# Patient Record
Sex: FEMALE | Race: BLACK OR AFRICAN AMERICAN | NOT HISPANIC OR LATINO | Employment: FULL TIME | URBAN - METROPOLITAN AREA
[De-identification: names, ages, dates, MRNs, and addresses within clinical notes are randomized per-mention and may not be internally consistent; named-entity substitution may affect disease eponyms.]

---

## 2019-09-08 ENCOUNTER — HOSPITAL ENCOUNTER (EMERGENCY)
Facility: HOSPITAL | Age: 64
Discharge: HOME | End: 2019-09-08
Attending: EMERGENCY MEDICINE
Payer: COMMERCIAL

## 2019-09-08 VITALS
TEMPERATURE: 98 F | SYSTOLIC BLOOD PRESSURE: 134 MMHG | BODY MASS INDEX: 33.6 KG/M2 | WEIGHT: 189.63 LBS | OXYGEN SATURATION: 97 % | HEART RATE: 67 BPM | RESPIRATION RATE: 21 BRPM | HEIGHT: 63 IN | DIASTOLIC BLOOD PRESSURE: 68 MMHG

## 2019-09-08 DIAGNOSIS — M25.562 PAIN AND SWELLING OF KNEE, LEFT: ICD-10-CM

## 2019-09-08 DIAGNOSIS — M25.462 PAIN AND SWELLING OF KNEE, LEFT: ICD-10-CM

## 2019-09-08 DIAGNOSIS — M79.662 PAIN OF LEFT CALF: ICD-10-CM

## 2019-09-08 DIAGNOSIS — S83.92XA SPRAIN OF LEFT KNEE, UNSPECIFIED LIGAMENT, INITIAL ENCOUNTER: Primary | ICD-10-CM

## 2019-09-08 LAB — HIV 1+2 AB+HIV1 P24 AG SERPL QL IA: NEGATIVE

## 2019-09-08 PROCEDURE — 86703 HIV-1/HIV-2 1 RESULT ANTBDY: CPT

## 2019-09-08 PROCEDURE — 99284 EMERGENCY DEPT VISIT MOD MDM: CPT | Mod: 25

## 2019-09-08 PROCEDURE — 25000003 PHARM REV CODE 250: Performed by: EMERGENCY MEDICINE

## 2019-09-08 RX ORDER — MELOXICAM 7.5 MG/1
15 TABLET ORAL DAILY
Qty: 20 TABLET | Refills: 0 | Status: SHIPPED | OUTPATIENT
Start: 2019-09-08 | End: 2019-09-18

## 2019-09-08 RX ORDER — NAPROXEN 500 MG/1
500 TABLET ORAL
Status: COMPLETED | OUTPATIENT
Start: 2019-09-08 | End: 2019-09-08

## 2019-09-08 RX ADMIN — NAPROXEN 500 MG: 500 TABLET ORAL at 10:09

## 2019-09-09 NOTE — ED NOTES
"Spoke with ADAM BookerC regarding patient wanting to be seen by a "Real Doctor" and not a midlevel and also regarding patient needing ED bed. WIll move to ED bed when available  "

## 2019-09-09 NOTE — ED PROVIDER NOTES
"Jamaica Gonzales 63 y.o. female PMHX LLE DVT presents to ED with c/o left knee and calf pain with swelling onset one month ago. Pt denies any injury or trauma. She reports is under Dr. Schilling care for DVT in LLE. She denies being on any blood thinner use. She reports HA and feeling fatigued. She denies any chest pain, SOB, N/V, abd pain at present.   8:19 PM I, Hanane Ceedno NP have performed the initial provider exam in triage, which included initial history, focused physical exam, and initial orders. The pt will be placed in a room as soon as possible with definitive evaluation by another provider.     Hanane Cedeno NP    PHYSICIAN NOTE  SCRIBE #1 NOTE: I, Fannie Jasso, am scribing for, and in the presence of, Edson Marquez MD. I have scribed the entire note.      History      Chief Complaint   Patient presents with    Knee Pain     Pt states, 'My left knee is just hurting so bad."       Review of patient's allergies indicates:  No Known Allergies     HPI   HPI    9/8/2019, 9:46 PM   History obtained from the patient      History of Present Illness: Jamaica Gonzales is a 63 y.o. female patient with a PMHx of DVT who presents to the Emergency Department for evaluation of Left knee pain which onset x1 month ago. Symptoms are gradually worsening and moderate in severity. Patient reports pain in her knee with flexion and extension, and states the pain is more severe in the lateral aspect. No mitigating or exacerbating factors reported. No associated sxs. Patient denies any fever, chills, extremity numbness/weakness, swelling, color change, recent trauma/injury, and all other sxs at this time. No prior Tx. No further complaints or concerns at this time.       Arrival mode: Personal vehicle     PCP: Primary Doctor No     Past Medical History:  Past medical history reviewed not relevant      Past Surgical History:  Past surgical history reviewed not relevant      Family History:  Family history reviewed not " relevant    Social History:  Social History     Tobacco Use    Smoking status: Unknown   Substance and Sexual Activity    Alcohol use: Unknown    Drug use: Unknown    Sexual activity: Unknown       ROS   Review of Systems   Constitutional: Negative for chills and fever.   HENT: Negative for sore throat.    Respiratory: Negative for shortness of breath.    Cardiovascular: Negative for chest pain and leg swelling.   Gastrointestinal: Negative for nausea.   Genitourinary: Negative for dysuria.   Musculoskeletal: Negative for back pain.        (+) L knee pain   Skin: Negative for color change and rash.   Neurological: Negative for weakness and numbness.   Hematological: Does not bruise/bleed easily.   All other systems reviewed and are negative.    Physical Exam      Initial Vitals [09/08/19 1837]   BP Pulse Resp Temp SpO2   (!) 151/68 75 20 98.3 °F (36.8 °C) 97 %      MAP       --          Physical Exam  Nursing Notes and Vital Signs Reviewed.  Constitutional: Patient is in no acute distress. Well-developed and well-nourished.  Head: Atraumatic. Normocephalic.  Eyes: PERRL. EOM intact. Conjunctivae are not pale. No scleral icterus.  ENT: Mucous membranes are moist.    Neck: Supple. Full ROM. No lymphadenopathy.  Cardiovascular: Regular rate. Regular rhythm. No murmurs, rubs, or gallops. Distal pulses are 2+ and symmetric.  Pulmonary/Chest: No respiratory distress. Clear to auscultation bilaterally. No wheezing or rales.  Abdominal: Soft and non-distended.  There is no tenderness.  No rebound, guarding, or rigidity.  Genitourinary: No CVA tenderness  Musculoskeletal: Moves all extremities. No obvious deformities. Mild ankle edema. No calf tenderness  Left Knee:  No obvious deformity. There is no appreciable swelling.  Left lateral and medial ligamentous tenderness. Negative anterior and posterior drawer signs. No bony tenderness. No increased warmth, erythema, induration or fluctuance.  No ligament laxity. DP and PT  "pulses are 2+.  Normal capillary refill.  Distal sensation is intact.  Skin: Warm and dry.  Neurological:  Alert, awake, and appropriate.  Normal speech.  No acute focal neurological deficits are appreciated.  Psychiatric: Normal affect. Good eye contact. Appropriate in content.    ED Course    Procedures  ED Vital Signs:  Vitals:    09/08/19 1837 09/08/19 2058 09/08/19 2100 09/08/19 2122   BP: (!) 151/68  132/77 134/68   Pulse: 75 60 60 67   Resp: 20  (!) 21 (!) 21   Temp: 98.3 °F (36.8 °C)      TempSrc: Oral      SpO2: 97%  97% 97%   Weight: 86 kg (189 lb 9.5 oz)      Height: 5' 3" (1.6 m)          Abnormal Lab Results:  Labs Reviewed   HIV 1 / 2 ANTIBODY        All Lab Results:  Results for orders placed or performed during the hospital encounter of 09/08/19   HIV 1/2 Ag/Ab (4th Gen)   Result Value Ref Range    HIV 1/2 Ag/Ab Negative Negative         Imaging Results:  Imaging Results          US Lower Extremity Veins Bilateral (Final result)  Result time 09/08/19 21:24:08    Final result by Andrade Ornelas MD (09/08/19 21:24:08)                 Impression:      Negative for lower extremity DVT, bilaterally.      Electronically signed by: Andrade Ornelas  Date:    09/08/2019  Time:    21:24             Narrative:    EXAMINATION:  US LOWER EXTREMITY VEINS BILATERAL    CLINICAL HISTORY:  . Left lower extremity swelling.    COMPARISON:  None.    FINDINGS:  The deep veins demonstrate a normal appearance of the common femoral vein, deep profunda, superficial femoral and popliteal vein.  There is no evidence of deep venous thrombosis.  Additionally, the greater saphenous, tibial and peroneal veins are patent.                               X-Ray Knee 3 View Left (Final result)  Result time 09/08/19 20:50:07   Procedure changed from X-Ray Knee 3 View Right     Final result by Andrade Ornelas MD (09/08/19 20:50:07)                 Impression:      Negative .      Electronically signed by: Andrade" Johnson  Date:    09/08/2019  Time:    20:50             Narrative:    EXAMINATION:  XR KNEE 3 VIEW LEFT    CLINICAL HISTORY:  knee pain; Pain in left knee    COMPARISON:  None    FINDINGS:  No fracture or dislocation.  Joint spaces are normal.  Soft tissues normal.                                        The Emergency Provider reviewed the vital signs and test results, which are outlined above.    ED Discussion     10:26 PM: Reassessed pt at this time.  Pt states her condition has improved at this time. Discussed with pt all pertinent ED information and results. Discussed pt dx and plan of tx. Gave pt all f/u and return to the ED instructions. All questions and concerns were addressed at this time. Pt expresses understanding of information and instructions, and is comfortable with plan to discharge. Pt is stable for discharge.    I discussed with patient and/or family/caretaker that evaluation in the ED does not suggest any emergent or life threatening medical conditions requiring immediate intervention beyond what was provided in the ED, and I believe patient is safe for discharge.  Regardless, an unremarkable evaluation in the ED does not preclude the development or presence of a serious of life threatening condition. As such, patient was instructed to return immediately for any worsening or change in current symptoms.      ED Medication(s):  Medications   naproxen tablet 500 mg (500 mg Oral Given 9/8/19 2224)             Medical Decision Making    Medical Decision Making:   Clinical Tests:   Lab Tests: Ordered and Reviewed  Radiological Study: Reviewed and Ordered           Scribe Attestation:   Scribe #1: I performed the above scribed service and the documentation accurately describes the services I performed. I attest to the accuracy of the note.    Attending:   Physician Attestation Statement for Scribe #1: I, Edson Marquez MD, personally performed the services described in this documentation, as  scribed by Fannie Jasso, in my presence, and it is both accurate and complete.          Clinical Impression       ICD-10-CM ICD-9-CM   1. Sprain of left knee, unspecified ligament, initial encounter S83.92XA 844.9   2. Pain and swelling of knee, left M25.562 719.46    M25.462 719.06   3. Pain of left calf M79.662 729.5       Disposition:   Disposition: Discharged  Condition: Stable         Edson Marquez MD  09/09/19 1121

## 2022-10-25 ENCOUNTER — LAB VISIT (OUTPATIENT)
Dept: LAB | Facility: HOSPITAL | Age: 67
End: 2022-10-25
Attending: FAMILY MEDICINE
Payer: MEDICARE

## 2022-10-25 ENCOUNTER — OFFICE VISIT (OUTPATIENT)
Dept: PRIMARY CARE CLINIC | Facility: CLINIC | Age: 67
End: 2022-10-25
Payer: MEDICARE

## 2022-10-25 VITALS
SYSTOLIC BLOOD PRESSURE: 146 MMHG | HEART RATE: 79 BPM | WEIGHT: 212.38 LBS | TEMPERATURE: 98 F | HEIGHT: 64 IN | DIASTOLIC BLOOD PRESSURE: 98 MMHG | OXYGEN SATURATION: 97 % | BODY MASS INDEX: 36.26 KG/M2

## 2022-10-25 DIAGNOSIS — E66.01 SEVERE OBESITY (BMI 35.0-39.9) WITH COMORBIDITY: ICD-10-CM

## 2022-10-25 DIAGNOSIS — I77.1 TORTUOUS AORTA: ICD-10-CM

## 2022-10-25 DIAGNOSIS — Z11.59 ENCOUNTER FOR HEPATITIS C SCREENING TEST FOR LOW RISK PATIENT: ICD-10-CM

## 2022-10-25 DIAGNOSIS — Z86.718 HISTORY OF DVT (DEEP VEIN THROMBOSIS): ICD-10-CM

## 2022-10-25 DIAGNOSIS — Z01.419 WELL WOMAN EXAM: ICD-10-CM

## 2022-10-25 DIAGNOSIS — K21.9 GASTROESOPHAGEAL REFLUX DISEASE WITHOUT ESOPHAGITIS: ICD-10-CM

## 2022-10-25 DIAGNOSIS — R60.0 LEG EDEMA, LEFT: ICD-10-CM

## 2022-10-25 DIAGNOSIS — Z12.11 COLON CANCER SCREENING: ICD-10-CM

## 2022-10-25 DIAGNOSIS — Z78.0 ASYMPTOMATIC MENOPAUSE: ICD-10-CM

## 2022-10-25 DIAGNOSIS — I10 ELEVATED BLOOD PRESSURE READING WITH DIAGNOSIS OF HYPERTENSION: Primary | ICD-10-CM

## 2022-10-25 DIAGNOSIS — Z23 FLU VACCINE NEED: ICD-10-CM

## 2022-10-25 DIAGNOSIS — I10 ELEVATED BLOOD PRESSURE READING WITH DIAGNOSIS OF HYPERTENSION: ICD-10-CM

## 2022-10-25 DIAGNOSIS — Z12.31 BREAST CANCER SCREENING BY MAMMOGRAM: ICD-10-CM

## 2022-10-25 LAB
ALBUMIN SERPL BCP-MCNC: 3.7 G/DL (ref 3.5–5.2)
ALP SERPL-CCNC: 150 U/L (ref 55–135)
ALT SERPL W/O P-5'-P-CCNC: 15 U/L (ref 10–44)
ANION GAP SERPL CALC-SCNC: 9 MMOL/L (ref 8–16)
AST SERPL-CCNC: 21 U/L (ref 10–40)
BILIRUB SERPL-MCNC: 0.7 MG/DL (ref 0.1–1)
BUN SERPL-MCNC: 9 MG/DL (ref 8–23)
CALCIUM SERPL-MCNC: 10 MG/DL (ref 8.7–10.5)
CHLORIDE SERPL-SCNC: 107 MMOL/L (ref 95–110)
CHOLEST SERPL-MCNC: 206 MG/DL (ref 120–199)
CHOLEST/HDLC SERPL: 4 {RATIO} (ref 2–5)
CO2 SERPL-SCNC: 25 MMOL/L (ref 23–29)
CREAT SERPL-MCNC: 1.1 MG/DL (ref 0.5–1.4)
EST. GFR  (NO RACE VARIABLE): 55.4 ML/MIN/1.73 M^2
GLUCOSE SERPL-MCNC: 69 MG/DL (ref 70–110)
HDLC SERPL-MCNC: 51 MG/DL (ref 40–75)
HDLC SERPL: 24.8 % (ref 20–50)
LDLC SERPL CALC-MCNC: 125 MG/DL (ref 63–159)
NONHDLC SERPL-MCNC: 155 MG/DL
POTASSIUM SERPL-SCNC: 3.9 MMOL/L (ref 3.5–5.1)
PROT SERPL-MCNC: 7.8 G/DL (ref 6–8.4)
SODIUM SERPL-SCNC: 141 MMOL/L (ref 136–145)
TRIGL SERPL-MCNC: 150 MG/DL (ref 30–150)

## 2022-10-25 PROCEDURE — G0008 FLU VACCINE - QUADRIVALENT - ADJUVANTED: ICD-10-PCS | Mod: S$GLB,,, | Performed by: FAMILY MEDICINE

## 2022-10-25 PROCEDURE — 99204 PR OFFICE/OUTPT VISIT, NEW, LEVL IV, 45-59 MIN: ICD-10-PCS | Mod: 25,S$GLB,, | Performed by: FAMILY MEDICINE

## 2022-10-25 PROCEDURE — 1159F MED LIST DOCD IN RCRD: CPT | Mod: CPTII,S$GLB,, | Performed by: FAMILY MEDICINE

## 2022-10-25 PROCEDURE — 80053 COMPREHEN METABOLIC PANEL: CPT | Performed by: FAMILY MEDICINE

## 2022-10-25 PROCEDURE — 1125F AMNT PAIN NOTED PAIN PRSNT: CPT | Mod: CPTII,S$GLB,, | Performed by: FAMILY MEDICINE

## 2022-10-25 PROCEDURE — 83036 HEMOGLOBIN GLYCOSYLATED A1C: CPT | Performed by: FAMILY MEDICINE

## 2022-10-25 PROCEDURE — 86803 HEPATITIS C AB TEST: CPT | Performed by: FAMILY MEDICINE

## 2022-10-25 PROCEDURE — 80061 LIPID PANEL: CPT | Performed by: FAMILY MEDICINE

## 2022-10-25 PROCEDURE — 99204 OFFICE O/P NEW MOD 45 MIN: CPT | Mod: 25,S$GLB,, | Performed by: FAMILY MEDICINE

## 2022-10-25 PROCEDURE — 3077F SYST BP >= 140 MM HG: CPT | Mod: CPTII,S$GLB,, | Performed by: FAMILY MEDICINE

## 2022-10-25 PROCEDURE — 3080F PR MOST RECENT DIASTOLIC BLOOD PRESSURE >= 90 MM HG: ICD-10-PCS | Mod: CPTII,S$GLB,, | Performed by: FAMILY MEDICINE

## 2022-10-25 PROCEDURE — 36415 COLL VENOUS BLD VENIPUNCTURE: CPT | Mod: PN | Performed by: FAMILY MEDICINE

## 2022-10-25 PROCEDURE — 1101F PT FALLS ASSESS-DOCD LE1/YR: CPT | Mod: CPTII,S$GLB,, | Performed by: FAMILY MEDICINE

## 2022-10-25 PROCEDURE — 90694 FLU VACCINE - QUADRIVALENT - ADJUVANTED: ICD-10-PCS | Mod: S$GLB,,, | Performed by: FAMILY MEDICINE

## 2022-10-25 PROCEDURE — 3288F FALL RISK ASSESSMENT DOCD: CPT | Mod: CPTII,S$GLB,, | Performed by: FAMILY MEDICINE

## 2022-10-25 PROCEDURE — G0008 ADMIN INFLUENZA VIRUS VAC: HCPCS | Mod: S$GLB,,, | Performed by: FAMILY MEDICINE

## 2022-10-25 PROCEDURE — 84443 ASSAY THYROID STIM HORMONE: CPT | Performed by: FAMILY MEDICINE

## 2022-10-25 PROCEDURE — 3077F PR MOST RECENT SYSTOLIC BLOOD PRESSURE >= 140 MM HG: ICD-10-PCS | Mod: CPTII,S$GLB,, | Performed by: FAMILY MEDICINE

## 2022-10-25 PROCEDURE — 3288F PR FALLS RISK ASSESSMENT DOCUMENTED: ICD-10-PCS | Mod: CPTII,S$GLB,, | Performed by: FAMILY MEDICINE

## 2022-10-25 PROCEDURE — 3080F DIAST BP >= 90 MM HG: CPT | Mod: CPTII,S$GLB,, | Performed by: FAMILY MEDICINE

## 2022-10-25 PROCEDURE — 1159F PR MEDICATION LIST DOCUMENTED IN MEDICAL RECORD: ICD-10-PCS | Mod: CPTII,S$GLB,, | Performed by: FAMILY MEDICINE

## 2022-10-25 PROCEDURE — 90694 VACC AIIV4 NO PRSRV 0.5ML IM: CPT | Mod: S$GLB,,, | Performed by: FAMILY MEDICINE

## 2022-10-25 PROCEDURE — 99999 PR PBB SHADOW E&M-NEW PATIENT-LVL V: CPT | Mod: PBBFAC,,, | Performed by: FAMILY MEDICINE

## 2022-10-25 PROCEDURE — 1101F PR PT FALLS ASSESS DOC 0-1 FALLS W/OUT INJ PAST YR: ICD-10-PCS | Mod: CPTII,S$GLB,, | Performed by: FAMILY MEDICINE

## 2022-10-25 PROCEDURE — 1125F PR PAIN SEVERITY QUANTIFIED, PAIN PRESENT: ICD-10-PCS | Mod: CPTII,S$GLB,, | Performed by: FAMILY MEDICINE

## 2022-10-25 PROCEDURE — 85025 COMPLETE CBC W/AUTO DIFF WBC: CPT | Performed by: FAMILY MEDICINE

## 2022-10-25 PROCEDURE — 99999 PR PBB SHADOW E&M-NEW PATIENT-LVL V: ICD-10-PCS | Mod: PBBFAC,,, | Performed by: FAMILY MEDICINE

## 2022-10-25 RX ORDER — OMEPRAZOLE 20 MG/1
20 CAPSULE, DELAYED RELEASE ORAL DAILY
Qty: 30 CAPSULE | Refills: 5 | Status: SHIPPED | OUTPATIENT
Start: 2022-10-25 | End: 2022-10-26 | Stop reason: SDUPTHER

## 2022-10-25 NOTE — PROGRESS NOTES
Subjective:       Patient ID: Jamaica Botello is a 67 y.o. female.    Chief Complaint: Establish Care, annual       HPI   History of Present Illness:   Jamaica Botello 67 y.o. female presents today with  She has not sen a provider in years.  Well Adult Physical: Patient here for a comprehensive physical exam.The patient reports problems - see below  Do you take any herbs or supplements that were not prescribed by a doctor? no Are you taking calcium supplements? yes Are you taking aspirin daily? not applicable   History:  Will need update on preventive health.     Blood pressure was found to be elevated today. Has history of HTN.  Not on med, does not check at home often. Advocates headache. Denies chest pain, palpitations, shortness of breath, dyspnea on exertion, left arm or neck pain, nausea, vomiting, diaphoresis, PND and orthopnea.      History of hysterectomy.    History of DVT in the remote past, x 2 following surgery, anticoagulant was d/cd in due time but she has residual edema which is causing discomfort..    Neck pain: paraspinal to the right -muscular.      History reviewed. No pertinent past medical history.  No family history on file.  Social History     Socioeconomic History    Marital status:    Tobacco Use    Smoking status: Never     Passive exposure: Never    Smokeless tobacco: Never   Substance and Sexual Activity    Alcohol use: Yes     Comment: ocassionally    Drug use: Never     Outpatient Encounter Medications as of 10/25/2022   Medication Sig Dispense Refill    omeprazole (PRILOSEC) 20 MG capsule Take 1 capsule (20 mg total) by mouth once daily. 30 capsule 5     No facility-administered encounter medications on file as of 10/25/2022.       Review of Systems    Review of Systems      A complete 10 point ROS was completed and are positive as per above HPI.    Otherwise negative for fever, diplopia, chest pain, shortness of breath, vomiting, blood in urine, joint pain, skin rash,  "seizures and unusual bleeding.       Objective:      BP (!) 146/98 (BP Location: Right arm, Patient Position: Sitting, BP Method: Medium (Manual))   Pulse 79   Temp 98.2 °F (36.8 °C) (Temporal)   Ht 5' 4" (1.626 m)   Wt 96.3 kg (212 lb 6.4 oz)   SpO2 97%   BMI 36.46 kg/m²   Physical Exam  Cardiovascular:      Pulses: Normal pulses.      Heart sounds: Normal heart sounds.   Pulmonary:      Effort: Pulmonary effort is normal.      Breath sounds: Normal breath sounds.   Musculoskeletal:      Left lower leg: Edema present.       CONSTITUTIONAL: No apparent distress. Appears comfortable. Does not appear acutely ill or septic. Appears adequately hydrated.  CARDIOVASCULAR: No perioral cyanosis  PULMONARY: Breathing unlabored. No retractions Chest expansion grossly normal.  PSYCHIATRIC: Alert and conversant and grossly oriented. Mood is grossly neutral. Affect appropriate. Judgment and insight grossly intact.  NEUROLOGIC: No focal sensory deficits reported.     Results for orders placed or performed during the hospital encounter of 09/08/19   HIV 1/2 Ag/Ab (4th Gen)   Result Value Ref Range    HIV 1/2 Ag/Ab Negative Negative     Assessment:       1. Elevated blood pressure reading with diagnosis of hypertension    2. Encounter for hepatitis C screening test for low risk patient    3. Colon cancer screening    4. Asymptomatic menopause    5. Breast cancer screening by mammogram    6. Gastroesophageal reflux disease without esophagitis    7. Well woman exam    8. Leg edema, left    9. History of DVT (deep vein thrombosis)    10. Flu vaccine need    11. Severe obesity (BMI 35.0-39.9) with comorbidity    12. Tortuous aorta          Plan:   Elevated blood pressure reading with diagnosis of hypertension  -     CBC Auto Differential; Future; Expected date: 10/25/2022  -     Comprehensive Metabolic Panel; Future; Expected date: 10/25/2022  -     Lipid Panel; Future; Expected date: 10/25/2022  -     Hemoglobin A1C; Future; " Expected date: 10/25/2022  -     TSH; Future; Expected date: 10/25/2022    Encounter for hepatitis C screening test for low risk patient  -     Hepatitis C Antibody; Future; Expected date: 10/25/2022    Colon cancer screening  -     Cologuard Screening (Multitarget Stool DNA); Future; Expected date: 10/25/2022    Asymptomatic menopause  -     DXA Bone Density Spine And Hip; Future; Expected date: 10/25/2022    Breast cancer screening by mammogram  -     Mammo Digital Screening Bilat w/ Filemon; Future; Expected date: 10/25/2022    Gastroesophageal reflux disease without esophagitis  -     omeprazole (PRILOSEC) 20 MG capsule; Take 1 capsule (20 mg total) by mouth once daily.  Dispense: 30 capsule; Refill: 5    Well woman exam  -     Ambulatory referral/consult to Gynecology; Future; Expected date: 11/01/2022    Leg edema, left  -     US Lower Extremity Veins Left; Future; Expected date: 10/25/2022    History of DVT (deep vein thrombosis)  Comments:  left leg x 2     Flu vaccine need  -     Influenza (FLUAD) - Quadrivalent (Adjuvanted) *Preferred* (65+) (PF)    Severe obesity (BMI 35.0-39.9) with comorbidity    Tortuous aorta    I have reviewed all of the patient's clinical history available in care everywhere and Epic and have utilized this in my evaluation and management recommendations today.   Treatment options and alternatives were discussed with the patient. Patient was given ample time to ask questions. All questions were answered. Voices understanding and acceptance of this advice. Will call back if any further questions or concerns.    Sylvia Jiang MD

## 2022-10-26 LAB
BASOPHILS # BLD AUTO: 0.04 K/UL (ref 0–0.2)
BASOPHILS NFR BLD: 0.6 % (ref 0–1.9)
DIFFERENTIAL METHOD: ABNORMAL
EOSINOPHIL # BLD AUTO: 0.2 K/UL (ref 0–0.5)
EOSINOPHIL NFR BLD: 3.5 % (ref 0–8)
ERYTHROCYTE [DISTWIDTH] IN BLOOD BY AUTOMATED COUNT: 14.3 % (ref 11.5–14.5)
ESTIMATED AVG GLUCOSE: 126 MG/DL (ref 68–131)
HBA1C MFR BLD: 6 % (ref 4–5.6)
HCT VFR BLD AUTO: 47.8 % (ref 37–48.5)
HCV AB SERPL QL IA: NORMAL
HGB BLD-MCNC: 14.2 G/DL (ref 12–16)
IMM GRANULOCYTES # BLD AUTO: 0.03 K/UL (ref 0–0.04)
IMM GRANULOCYTES NFR BLD AUTO: 0.5 % (ref 0–0.5)
LYMPHOCYTES # BLD AUTO: 2.6 K/UL (ref 1–4.8)
LYMPHOCYTES NFR BLD: 38.8 % (ref 18–48)
MCH RBC QN AUTO: 27.4 PG (ref 27–31)
MCHC RBC AUTO-ENTMCNC: 29.7 G/DL (ref 32–36)
MCV RBC AUTO: 92 FL (ref 82–98)
MONOCYTES # BLD AUTO: 0.7 K/UL (ref 0.3–1)
MONOCYTES NFR BLD: 10 % (ref 4–15)
NEUTROPHILS # BLD AUTO: 3.1 K/UL (ref 1.8–7.7)
NEUTROPHILS NFR BLD: 46.6 % (ref 38–73)
NRBC BLD-RTO: 0 /100 WBC
PLATELET # BLD AUTO: 417 K/UL (ref 150–450)
PMV BLD AUTO: 10.1 FL (ref 9.2–12.9)
RBC # BLD AUTO: 5.18 M/UL (ref 4–5.4)
TSH SERPL DL<=0.005 MIU/L-ACNC: 2.17 UIU/ML (ref 0.4–4)
WBC # BLD AUTO: 6.62 K/UL (ref 3.9–12.7)

## 2022-11-02 ENCOUNTER — TELEPHONE (OUTPATIENT)
Dept: PRIMARY CARE CLINIC | Facility: CLINIC | Age: 67
End: 2022-11-02
Payer: MEDICARE

## 2022-11-02 DIAGNOSIS — R73.03 PREDIABETES: Primary | ICD-10-CM

## 2022-11-02 RX ORDER — METFORMIN HYDROCHLORIDE 500 MG/1
500 TABLET, EXTENDED RELEASE ORAL
Qty: 30 TABLET | Refills: 1 | Status: SHIPPED | OUTPATIENT
Start: 2022-11-02 | End: 2022-12-02

## 2022-11-02 NOTE — TELEPHONE ENCOUNTER
----- Message from Sylvia Jiang MD sent at 11/2/2022  9:14 AM CDT -----  I have reviewed all your lab results.   Blood count, kidney function, liver function, electrolytes, and thyroid function are all normal.    Your A1C shows that you have prediabetes and your cholesterol is borderline high. Therefore Maintain/Continue a heathy lifestyle.  Choose a diet low  in carbohydrates and high in proteins, vegetables, and low-fat dairy products, but low in meats, sweets, and refined grains   Be more active. If you are able, walk for 35 mins 5 times a week.      Age appropriate Hep C screen is negative. No further action required.     Please do not hesitate to contact us if you have any questions.

## 2022-11-02 NOTE — PROGRESS NOTES
I have reviewed all your lab results.   Blood count, kidney function, liver function, electrolytes, and thyroid function are all normal.    Your A1C shows that you have prediabetes and your cholesterol is borderline high. Therefore Maintain/Continue a heathy lifestyle.  Choose a diet low  in carbohydrates and high in proteins, vegetables, and low-fat dairy products, but low in meats, sweets, and refined grains   Be more active. If you are able, walk for 35 mins 5 times a week.      Age appropriate Hep C screen is negative. No further action required.    Please do not hesitate to contact us if you have any questions.

## 2022-11-02 NOTE — TELEPHONE ENCOUNTER
Patient called regarding labs. Patient didn't answer, unable to leave message due to mailbox being full.        ----- Message from Sylvia Jiang MD sent at 11/2/2022  9:14 AM CDT -----  I have reviewed all your lab results.   Blood count, kidney function, liver function, electrolytes, and thyroid function are all normal.    Your A1C shows that you have prediabetes and your cholesterol is borderline high. Therefore Maintain/Continue a heathy lifestyle.  Choose a diet low  in carbohydrates and high in proteins, vegetables, and low-fat dairy products, but low in meats, sweets, and refined grains   Be more active. If you are able, walk for 35 mins 5 times a week.      Age appropriate Hep C screen is negative. No further action required.     Please do not hesitate to contact us if you have any questions.

## 2022-11-03 ENCOUNTER — TELEPHONE (OUTPATIENT)
Dept: PRIMARY CARE CLINIC | Facility: CLINIC | Age: 67
End: 2022-11-03
Payer: MEDICARE

## 2022-11-03 NOTE — TELEPHONE ENCOUNTER
Spoke with pt verbalized understanding of lab results and recommendation of Dr. DENNYS Hodge upon review

## 2022-12-28 ENCOUNTER — HOSPITAL ENCOUNTER (OUTPATIENT)
Dept: RADIOLOGY | Facility: HOSPITAL | Age: 67
Discharge: HOME | End: 2022-12-28
Attending: FAMILY MEDICINE
Payer: MEDICARE

## 2022-12-28 ENCOUNTER — TELEPHONE (OUTPATIENT)
Dept: PRIMARY CARE CLINIC | Facility: CLINIC | Age: 67
End: 2022-12-28
Payer: MEDICARE

## 2022-12-28 DIAGNOSIS — Z12.31 BREAST CANCER SCREENING BY MAMMOGRAM: ICD-10-CM

## 2022-12-28 DIAGNOSIS — R60.0 LEG EDEMA, LEFT: ICD-10-CM

## 2022-12-28 DIAGNOSIS — I82.412 ACUTE DEEP VEIN THROMBOSIS (DVT) OF FEMORAL VEIN OF LEFT LOWER EXTREMITY: Primary | ICD-10-CM

## 2022-12-28 PROCEDURE — 93971 US LOWER EXTREMITY VEINS LEFT: ICD-10-PCS | Mod: 26,LT,, | Performed by: RADIOLOGY

## 2022-12-28 PROCEDURE — 77067 MAMMO DIGITAL SCREENING BILAT WITH TOMO: ICD-10-PCS | Mod: 26,,, | Performed by: RADIOLOGY

## 2022-12-28 PROCEDURE — 77063 BREAST TOMOSYNTHESIS BI: CPT | Mod: 26,,, | Performed by: RADIOLOGY

## 2022-12-28 PROCEDURE — 77067 SCR MAMMO BI INCL CAD: CPT | Mod: 26,,, | Performed by: RADIOLOGY

## 2022-12-28 PROCEDURE — 93971 EXTREMITY STUDY: CPT | Mod: TC,LT

## 2022-12-28 PROCEDURE — 77063 BREAST TOMOSYNTHESIS BI: CPT | Mod: TC

## 2022-12-28 PROCEDURE — 77063 MAMMO DIGITAL SCREENING BILAT WITH TOMO: ICD-10-PCS | Mod: 26,,, | Performed by: RADIOLOGY

## 2022-12-28 PROCEDURE — 93971 EXTREMITY STUDY: CPT | Mod: 26,LT,, | Performed by: RADIOLOGY

## 2022-12-28 PROCEDURE — 77067 SCR MAMMO BI INCL CAD: CPT | Mod: TC

## 2022-12-28 NOTE — TELEPHONE ENCOUNTER
Received a call from Radiology stating patient had a DVT involving the left lower leg.  Patient was seen by Dr. Jiang in October for acquired a swelling in involving the left lower extremity.  Patient reports a history of DVT in the remote past.  Patient denies acute shortness of breath or chest pain.  Prescription for Xarelto sent to the pharmacy and referral to Hematology.  Patient is to follow-up with PCP next month.  .

## 2023-01-05 ENCOUNTER — TELEPHONE (OUTPATIENT)
Dept: HEMATOLOGY/ONCOLOGY | Facility: CLINIC | Age: 68
End: 2023-01-05
Payer: MEDICARE

## 2023-01-05 NOTE — TELEPHONE ENCOUNTER
Spoke to patient in reference to Hematology referral from Dr. Myles.  Appointment scheduled per patient's request next available on [facility].  Appointment notice via [employer].

## 2023-01-13 ENCOUNTER — TELEPHONE (OUTPATIENT)
Dept: PRIMARY CARE CLINIC | Facility: CLINIC | Age: 68
End: 2023-01-13
Payer: MEDICARE

## 2023-01-13 NOTE — PROGRESS NOTES
Your yearly mammogram is negative which is a good result. Since it is yearly, you are due for another mammo this time next year. Thank you.      Please book a blood pressure check  with Nurse to document this patient's blood pressure because the last one that was listed on the chart was high. Thank you.

## 2023-01-13 NOTE — TELEPHONE ENCOUNTER
Patient called regarding US. Patient was informed of results and provider orders. Patient voiced understanding.  ----- Message from Sylvia Jiang MD sent at 1/13/2023  2:31 PM CST -----  Your yearly mammogram is negative which is a good result. Since it is yearly, you are due for another mammo this time next year. Thank you.       Please book a blood pressure check  with Nurse to document this patient's blood pressure because the last one that was listed on the chart was high. Thank you.

## 2023-01-25 ENCOUNTER — OFFICE VISIT (OUTPATIENT)
Dept: HEMATOLOGY/ONCOLOGY | Facility: CLINIC | Age: 68
End: 2023-01-25
Payer: MEDICARE

## 2023-01-25 VITALS
TEMPERATURE: 97 F | HEART RATE: 80 BPM | DIASTOLIC BLOOD PRESSURE: 74 MMHG | WEIGHT: 212.75 LBS | HEIGHT: 64 IN | BODY MASS INDEX: 36.32 KG/M2 | OXYGEN SATURATION: 98 % | RESPIRATION RATE: 18 BRPM | SYSTOLIC BLOOD PRESSURE: 128 MMHG

## 2023-01-25 DIAGNOSIS — I82.412 ACUTE DEEP VEIN THROMBOSIS (DVT) OF FEMORAL VEIN OF LEFT LOWER EXTREMITY: ICD-10-CM

## 2023-01-25 DIAGNOSIS — Z86.718 HISTORY OF DVT (DEEP VEIN THROMBOSIS): Primary | ICD-10-CM

## 2023-01-25 PROCEDURE — 99204 PR OFFICE/OUTPT VISIT, NEW, LEVL IV, 45-59 MIN: ICD-10-PCS | Mod: S$GLB,,, | Performed by: INTERNAL MEDICINE

## 2023-01-25 PROCEDURE — 1159F MED LIST DOCD IN RCRD: CPT | Mod: CPTII,S$GLB,, | Performed by: INTERNAL MEDICINE

## 2023-01-25 PROCEDURE — 1126F PR PAIN SEVERITY QUANTIFIED, NO PAIN PRESENT: ICD-10-PCS | Mod: CPTII,S$GLB,, | Performed by: INTERNAL MEDICINE

## 2023-01-25 PROCEDURE — 99999 PR PBB SHADOW E&M-EST. PATIENT-LVL III: ICD-10-PCS | Mod: PBBFAC,,, | Performed by: INTERNAL MEDICINE

## 2023-01-25 PROCEDURE — 3008F PR BODY MASS INDEX (BMI) DOCUMENTED: ICD-10-PCS | Mod: CPTII,S$GLB,, | Performed by: INTERNAL MEDICINE

## 2023-01-25 PROCEDURE — 99204 OFFICE O/P NEW MOD 45 MIN: CPT | Mod: S$GLB,,, | Performed by: INTERNAL MEDICINE

## 2023-01-25 PROCEDURE — 99999 PR PBB SHADOW E&M-EST. PATIENT-LVL III: CPT | Mod: PBBFAC,,, | Performed by: INTERNAL MEDICINE

## 2023-01-25 PROCEDURE — 3288F PR FALLS RISK ASSESSMENT DOCUMENTED: ICD-10-PCS | Mod: CPTII,S$GLB,, | Performed by: INTERNAL MEDICINE

## 2023-01-25 PROCEDURE — 1126F AMNT PAIN NOTED NONE PRSNT: CPT | Mod: CPTII,S$GLB,, | Performed by: INTERNAL MEDICINE

## 2023-01-25 PROCEDURE — 3078F PR MOST RECENT DIASTOLIC BLOOD PRESSURE < 80 MM HG: ICD-10-PCS | Mod: CPTII,S$GLB,, | Performed by: INTERNAL MEDICINE

## 2023-01-25 PROCEDURE — 1101F PT FALLS ASSESS-DOCD LE1/YR: CPT | Mod: CPTII,S$GLB,, | Performed by: INTERNAL MEDICINE

## 2023-01-25 PROCEDURE — 3008F BODY MASS INDEX DOCD: CPT | Mod: CPTII,S$GLB,, | Performed by: INTERNAL MEDICINE

## 2023-01-25 PROCEDURE — 1101F PR PT FALLS ASSESS DOC 0-1 FALLS W/OUT INJ PAST YR: ICD-10-PCS | Mod: CPTII,S$GLB,, | Performed by: INTERNAL MEDICINE

## 2023-01-25 PROCEDURE — 3078F DIAST BP <80 MM HG: CPT | Mod: CPTII,S$GLB,, | Performed by: INTERNAL MEDICINE

## 2023-01-25 PROCEDURE — 3074F SYST BP LT 130 MM HG: CPT | Mod: CPTII,S$GLB,, | Performed by: INTERNAL MEDICINE

## 2023-01-25 PROCEDURE — 3074F PR MOST RECENT SYSTOLIC BLOOD PRESSURE < 130 MM HG: ICD-10-PCS | Mod: CPTII,S$GLB,, | Performed by: INTERNAL MEDICINE

## 2023-01-25 PROCEDURE — 1159F PR MEDICATION LIST DOCUMENTED IN MEDICAL RECORD: ICD-10-PCS | Mod: CPTII,S$GLB,, | Performed by: INTERNAL MEDICINE

## 2023-01-25 PROCEDURE — 3288F FALL RISK ASSESSMENT DOCD: CPT | Mod: CPTII,S$GLB,, | Performed by: INTERNAL MEDICINE

## 2023-01-25 RX ORDER — AMLODIPINE BESYLATE 5 MG/1
TABLET ORAL DAILY
COMMUNITY
Start: 2022-11-29

## 2023-01-25 RX ORDER — CHOLECALCIFEROL (VITAMIN D3) 25 MCG
2000 TABLET ORAL
COMMUNITY

## 2023-01-25 RX ORDER — APIXABAN 5 MG/1
TABLET, FILM COATED ORAL 2 TIMES DAILY
COMMUNITY
Start: 2022-12-05

## 2023-01-25 RX ORDER — PANTOPRAZOLE SODIUM 40 MG/1
1 TABLET, DELAYED RELEASE ORAL DAILY
COMMUNITY
Start: 2022-11-29

## 2023-01-25 NOTE — PROGRESS NOTES
Subjective:      DATE OF VISIT: 1/25/23     ?  Patient ID:?Jamaica Botello is a 67 y.o. female.?? MR#: [id number]   ?   REFERRING PROVIDER: Raquel Myles MD  [id number] [place] [address]     ? Primary Care Providers:  Primary Doctor No (General)     CHIEF COMPLAINT: ?Recurrent left lower extremity DVT, 2009, 2011, December 2022??   ?   HPI    I had the pleasure meeting Ms. Mendoza a 67-year-old woman with hypertension, obesity presenting for recurrent DVT left lower extremity.  Christmas 2022 she did have over 8 hour drive to [place] after which development of progressive left lower extremity edema.  She does note chronic issues with edema in left lower extremity.  This has been since her initial episode of acute DVT in 2009 left lower extremity in setting of small-bowel obstruction and postoperative limited mobility.  She notes recurrent issue of thrombosis in 2011 but is unaware of anticoagulation at that time.  She notes discussion of possible IVC filter but not ultimately placed.  She did have lab workup at [facility] in 2012 reviewed shows negative DRVVT, factor 5 Leiden, anti cardiolipin antibody, prothrombin gene mutation, antithrombin, protein C and protein S.    She notes being up-to-date on mammogram.  She is not had colonoscopy.    Review of Systems    ?   A comprehensive 14-point review of systems was reviewed with patient and was negative other than as specified above.   ?   PAST MEDICAL HISTORY:   Past Medical History:   Diagnosis Date    Hypertension     ?     PAST SURGICAL HISTORY:   Past Surgical History:   Procedure Laterality Date    REMOVAL OF BLOOD CLOT      small bowel obstruction  2009      ?   ALLERGIES:   Allergies as of 01/25/2023 - Reviewed 01/25/2023   Allergen Reaction Noted    Meperidine  06/04/2017      ?   MEDICATIONS:?   Outpatient Medications Marked as Taking for the 1/25/23 encounter (Office Visit) with Lazara Yi MD   Medication Sig Dispense  Refill    amLODIPine (NORVASC) 5 MG tablet Take by mouth once daily.      ELIQUIS 5 mg Tab Take by mouth 2 (two) times a day.      multivitamin capsule Take 1 capsule by mouth once daily.      pantoprazole (PROTONIX) 40 MG tablet Take 1 tablet by mouth once daily.      vitamin D (VITAMIN D3) 1000 units Tab Take 2,000 Units by mouth.        ?   SOCIAL HISTORY:?   Social History     Tobacco Use    Smoking status: Never     Passive exposure: Never    Smokeless tobacco: Never   Substance Use Topics    Alcohol use: Yes     Comment: ocassionally      ?      ?   FAMILY HISTORY:   family history includes Breast cancer in her sister.   ?        Objective:      Physical Exam      ?   Vitals:    01/25/23 1451   BP: 128/74   Pulse: 80   Resp: 18   Temp: 97.3 °F (36.3 °C)      ?   ECOG:?1  General appearance: Generally well appearing, in no acute distress.   Head, eyes, ears, nose, and throat: moist mucous membranes.   Respiratory:  Normal work of breathing  Abdomen: nontender, nondistended.   Extremities: Warm, with chronic edema bilateral lower extremities.  Neurologic: Alert and oriented.   Skin: No rashes, ecchymoses or petechial lesion.   Psychiatric:  Normal mood and affect.    ?   Laboratory:    Lab Results   Component Value Date    WBC 6.62 10/25/2022    RBC 5.18 10/25/2022    HGB 14.2 10/25/2022    HCT 47.8 10/25/2022    MCV 92 10/25/2022    MCH 27.4 10/25/2022    MCHC 29.7 (L) 10/25/2022    RDW 14.3 10/25/2022     10/25/2022    MPV 10.1 10/25/2022    GRAN 3.1 10/25/2022    GRAN 46.6 10/25/2022    LYMPH 2.6 10/25/2022    LYMPH 38.8 10/25/2022    MONO 0.7 10/25/2022    MONO 10.0 10/25/2022    EOS 0.2 10/25/2022    BASO 0.04 10/25/2022    EOSINOPHIL 3.5 10/25/2022    BASOPHIL 0.6 10/25/2022       Sodium   Date Value Ref Range Status   10/25/2022 141 136 - 145 mmol/L Final     Potassium   Date Value Ref Range Status   10/25/2022 3.9 3.5 - 5.1 mmol/L Final     Chloride   Date Value Ref Range Status   10/25/2022 107  95 - 110 mmol/L Final     CO2   Date Value Ref Range Status   10/25/2022 25 23 - 29 mmol/L Final     Glucose   Date Value Ref Range Status   10/25/2022 69 (L) 70 - 110 mg/dL Final     BUN   Date Value Ref Range Status   10/25/2022 9 8 - 23 mg/dL Final     Creatinine   Date Value Ref Range Status   10/25/2022 1.1 0.5 - 1.4 mg/dL Final     Calcium   Date Value Ref Range Status   10/25/2022 10.0 8.7 - 10.5 mg/dL Final     Total Protein   Date Value Ref Range Status   10/25/2022 7.8 6.0 - 8.4 g/dL Final     Albumin   Date Value Ref Range Status   10/25/2022 3.7 3.5 - 5.2 g/dL Final     Total Bilirubin   Date Value Ref Range Status   10/25/2022 0.7 0.1 - 1.0 mg/dL Final     Comment:     For infants and newborns, interpretation of results should be based  on gestational age, weight and in agreement with clinical  observations.    Premature Infant recommended reference ranges:  Up to 24 hours.............<8.0 mg/dL  Up to 48 hours............<12.0 mg/dL  3-5 days..................<15.0 mg/dL  6-29 days.................<15.0 mg/dL       Alkaline Phosphatase   Date Value Ref Range Status   10/25/2022 150 (H) 55 - 135 U/L Final     AST   Date Value Ref Range Status   10/25/2022 21 10 - 40 U/L Final     ALT   Date Value Ref Range Status   10/25/2022 15 10 - 44 U/L Final     Anion Gap   Date Value Ref Range Status   10/25/2022 9 8 - 16 mmol/L Final       TSH   Date Value Ref Range Status   10/25/2022 2.169 0.400 - 4.000 uIU/mL Final     12/28/22  US LOWER EXTREMITY VEINS LEFT     CLINICAL HISTORY:  Localized edema     TECHNIQUE:  Duplex and color flow Doppler evaluation and graded compression of the left lower extremity veins was performed.     COMPARISON:  09/08/2019     FINDINGS:  Left thigh veins: There is a small amount of nonocclusive thrombus present in the left common femoral vein as well as the mid and distal segments of the left femoral vein.  Findings were not definitely seen on prior.     Left calf veins: The  visualized calf veins are patent.     Miscellaneous: None     Impression:     Small amount of nonocclusive thrombus in the left common femoral and femoral veins, not definitely seen on prior.     This report was flagged in Epic as abnormal.        Electronically signed by: Roldan Felipe MD  Date:                                            12/28/2022  Time:                                           11:06      ?   Assessment/Plan:   Acute deep vein thrombosis (DVT) of femoral vein of left lower extremity  -     Ambulatory referral/consult to Hematology / Oncology       1. Acute deep vein thrombosis (DVT) of femoral vein of left lower extremity            Plan:     Problem List Items Addressed This Visit    None  Visit Diagnoses       Acute deep vein thrombosis (DVT) of femoral vein of left lower extremity            Recurrent DVT left lower extremity:  December 2022 with left femoral small amount nonocclusive thrombus left common femoral and femoral veins.  Developed in setting of prolonged car drive relative immobility.  She was initiated on anticoagulation with apixaban 5 mg b.i.d. which she is tolerating well without bleeding complication.  Initial left lower extremity DVT 2009 provoked in setting of small-bowel obstruction hospitalization impaired mobility.  She notes another episode of 2011 of left lower extremity DVT unclear provoking cause.  She did have 2012 inherited thrombophilia testing at [facility] unrevealing (normal protein C, S, antithrombin, factor 5 Leiden, cardiolipin, DRVVT, prothrombin gene mutation).  Chronic risk factors of obesity and chronic edema with recurrent DVT 2009 and December 2022 seemingly provoked but she does have ongoing chronic risk factors of obesity and chronic edema possible anatomic predilection and we discussed consideration of indefinite anticoagulation for protection as long she is tolerating well without elevated risk of bleeding in weighing risks and  benefits.  Recommend continuation of her current dose anticoagulation for 3-6 months and reconsideration of lower dose maintenance thereafter.  Recommend age-appropriate cancer screening continuing mammogram and colonoscopy.  She expressed good understanding and agree with this plan.    Follow-Up:   Route Chart for Scheduling    Med Onc Chart Routing      Follow up with physician Other. in 5 mo, virtual ok   Follow up with JIMMIE    Infusion scheduling note    Injection scheduling note    Labs    Imaging    Pharmacy appointment    Other referrals             45 minutes of total time spent on the encounter, which includes face to face time and non-face to face time preparing to see the patient (eg, review of tests), Obtaining and/or reviewing separately obtained history, Documenting clinical information in the electronic or other health record, Independently interpreting results (not separately reported) and communicating results to the patient/family/caregiver, or Care coordination (not separately reported).